# Patient Record
Sex: FEMALE | Race: BLACK OR AFRICAN AMERICAN | Employment: UNEMPLOYED
[De-identification: names, ages, dates, MRNs, and addresses within clinical notes are randomized per-mention and may not be internally consistent; named-entity substitution may affect disease eponyms.]

---

## 2022-06-28 RX ORDER — BRIMONIDINE TARTRATE 2 MG/ML
SOLUTION/ DROPS OPHTHALMIC
COMMUNITY

## 2022-06-28 RX ORDER — SIMVASTATIN 40 MG
TABLET ORAL
COMMUNITY
End: 2022-10-08 | Stop reason: SDUPTHER

## 2022-06-28 RX ORDER — AMLODIPINE BESYLATE 10 MG/1
TABLET ORAL
COMMUNITY
End: 2022-10-08 | Stop reason: SDUPTHER

## 2022-06-28 RX ORDER — TRIAMCINOLONE ACETONIDE 1 MG/G
CREAM TOPICAL
COMMUNITY

## 2022-06-28 RX ORDER — IRBESARTAN 150 MG/1
TABLET ORAL
COMMUNITY
End: 2022-09-26

## 2022-06-28 RX ORDER — MECLIZINE HYDROCHLORIDE 25 MG/1
TABLET ORAL
COMMUNITY

## 2022-06-28 RX ORDER — ACETAMINOPHEN 325 MG/1
TABLET ORAL
COMMUNITY

## 2023-03-13 PROBLEM — I10 ESSENTIAL HYPERTENSION: Status: ACTIVE | Noted: 2023-03-13

## 2023-03-13 PROBLEM — C50.919 MALIGNANT TUMOR OF BREAST (HCC): Status: ACTIVE | Noted: 2023-03-13

## 2023-03-13 PROBLEM — E55.9 VITAMIN D DEFICIENCY: Status: ACTIVE | Noted: 2023-03-13

## 2023-03-13 PROBLEM — E78.00 PURE HYPERCHOLESTEROLEMIA: Status: ACTIVE | Noted: 2023-03-13

## 2023-03-13 PROBLEM — E87.1 HYPONATREMIA: Status: ACTIVE | Noted: 2023-03-13

## 2023-03-13 PROBLEM — Z85.3 PERSONAL HISTORY OF BREAST CANCER: Status: ACTIVE | Noted: 2023-03-13

## 2023-03-13 PROBLEM — R26.9 ABNORMAL GAIT: Status: ACTIVE | Noted: 2023-03-13

## 2023-03-13 PROBLEM — E11.9 TYPE 2 DIABETES MELLITUS WITHOUT COMPLICATION (HCC): Status: ACTIVE | Noted: 2023-03-13

## 2024-09-24 ENCOUNTER — CARE COORDINATION (OUTPATIENT)
Facility: CLINIC | Age: 89
End: 2024-09-24

## 2024-10-01 ENCOUNTER — CARE COORDINATION (OUTPATIENT)
Facility: CLINIC | Age: 89
End: 2024-10-01

## 2024-10-01 NOTE — CARE COORDINATION
Ambulatory Care Coordination Note     10/1/2024 9:45 AM     Patient outreach attempt by this ACM today to offer care management services. ACM was unable to reach the patient by telephone today;  phone line busy.     ACM: ISSA CHOPRA RN         PCP/Specialist follow up:   Future Appointments         Provider Specialty Dept Phone    11/4/2024 3:30 PM Juan Manuel Crane MD Primary Care [phone]            Follow Up:   Plan for next ACM outreach in approximately 1-2 days  to complete:  - outreach attempt to offer care management services.